# Patient Record
Sex: FEMALE | Race: WHITE | NOT HISPANIC OR LATINO | Employment: OTHER | URBAN - METROPOLITAN AREA
[De-identification: names, ages, dates, MRNs, and addresses within clinical notes are randomized per-mention and may not be internally consistent; named-entity substitution may affect disease eponyms.]

---

## 2022-09-13 ENCOUNTER — HOSPITAL ENCOUNTER (EMERGENCY)
Facility: CLINIC | Age: 27
Discharge: HOME | End: 2022-09-13
Admitting: PHYSICIAN ASSISTANT
Payer: OTHER GOVERNMENT

## 2022-09-13 ENCOUNTER — APPOINTMENT (OUTPATIENT)
Dept: ULTRASOUND IMAGING | Facility: CLINIC | Age: 27
End: 2022-09-13
Payer: OTHER GOVERNMENT

## 2022-09-13 VITALS
DIASTOLIC BLOOD PRESSURE: 81 MMHG | BODY MASS INDEX: 32.14 KG/M2 | RESPIRATION RATE: 18 BRPM | WEIGHT: 200 LBS | OXYGEN SATURATION: 97 % | HEIGHT: 66 IN | TEMPERATURE: 98 F | HEART RATE: 91 BPM | SYSTOLIC BLOOD PRESSURE: 119 MMHG

## 2022-09-13 DIAGNOSIS — O20.8 SUBCHORIONIC HEMORRHAGE IN FIRST TRIMESTER: ICD-10-CM

## 2022-09-13 PROCEDURE — 76801 OB US < 14 WKS SINGLE FETUS: CPT

## 2022-09-13 PROCEDURE — 99284 EMERGENCY DEPT VISIT MOD MDM: CPT | Mod: 25

## 2022-09-13 ASSESSMENT — ENCOUNTER SYMPTOMS: ABDOMINAL PAIN: 1

## 2022-09-13 ASSESSMENT — ACTIVITIES OF DAILY LIVING (ADL): ADLS_ACUITY_SCORE: 33

## 2022-09-13 NOTE — DISCHARGE INSTRUCTIONS
Your ultrasound is reassuring showing a single intrauterine pregnancy dating 9 weeks.  There is a subchorionic hemorrhage but with you reporting no vaginal bleeding and no significant abdominal pain at this point time this can be followed up as an outpatient.

## 2022-09-13 NOTE — ED PROVIDER NOTES
EMERGENCY DEPARTMENT ENCOUNTER      NAME: Amelia Abel  AGE: 27 year old female  YOB: 1995  MRN: [id number]  EVALUATION DATE & TIME: 2022  4:36 PM    PCP: No primary care provider on file.    ED PROVIDER: Saleem Bledsoe PA-C      Chief Complaint   Patient presents with     Groin Pain         FINAL IMPRESSION:  1. Subchorionic hemorrhage in first trimester          MEDICAL DECISION MAKING:    Pertinent Labs & Imaging studies reviewed. (See chart for details)  27 year old female presents to the Emergency Department for evaluation of episode of right lower quadrant pain.  Patient is 9 weeks pregnant.  She denies any vaginal bleeding, currently the pain is resolved but she is concerned with the pregnancy.    At this point time plan to screen with OB ultrasound first trimester.    Ultrasound does confirm single intrauterine pregnancy.  There is a low large subchorionic hemorrhage, but patient denies any vaginal bleeding and she currently denies any abdominal pain.  At this point time she will be discharged to home with recommended outpatient follow-up through her OB/GYN.    ED COURSE    I met with the patient, obtained history, performed an initial exam, and discussed options and plan for diagnostics and treatment here in the ED.    At the conclusion of the encounter I discussed the results of all of the tests and the disposition. The questions were answered. The patient or family acknowledged understanding and was agreeable with the care plan.     MEDICATIONS GIVEN IN THE EMERGENCY:  Medications - No data to display    NEW PRESCRIPTIONS STARTED AT TODAY'S ER VISIT  New Prescriptions    No medications on file            =================================================================    HPI    Patient information was obtained from: Patient  Amelia Abel is a 27 year old female with a pertinent history of G2,  who presents to this ED for evaluation of acute episode of right lower  "quadrant abdominal pain.  The pain started abruptly while she was doing [employer] exercises.  She had a 50 pound ball when she was doing a deep squat and when she stood back up she felt the pain in her right inguinal area.  This was done earlier in the day and since then the pain is completely resolved.  She is 9 weeks pregnant.  She denies any vaginal bleeding.  No reports of urinary symptoms.  She denies any fever or chills.  Because she is pregnant she had a previous miscarriage she wanted to be assessed here to make sure pregnancy was fine.      REVIEW OF SYSTEMS   Review of Systems   Gastrointestinal: Positive for abdominal pain.   Genitourinary: Negative for vaginal bleeding and vaginal discharge.   All other systems reviewed and are negative.         PAST MEDICAL HISTORY:  No past medical history on file.    PAST SURGICAL HISTORY:  No past surgical history on file.      CURRENT MEDICATIONS:    No current facility-administered medications for this encounter.  No current outpatient medications on file.      ALLERGIES:  No Known Allergies    FAMILY HISTORY:  No family history on file.    SOCIAL HISTORY:        VITALS:  Patient Vitals for the past 24 hrs:   BP Temp Temp src Pulse Resp SpO2 Height Weight   09/13/22 1620 119/81 98  F (36.7  C) Temporal 91 18 97 % 1.676 m (5' 6\") 90.7 kg (200 lb)       PHYSICAL EXAM    Physical Exam  Vitals and nursing note reviewed.   Constitutional:       General: She is not in acute distress.     Appearance: She is normal weight.   HENT:      Head: Normocephalic.      Right Ear: External ear normal.      Left Ear: External ear normal.      Nose: Nose normal.   Eyes:      Conjunctiva/sclera: Conjunctivae normal.   Cardiovascular:      Rate and Rhythm: Normal rate.      Pulses: Normal pulses.   Pulmonary:      Effort: Pulmonary effort is normal. No respiratory distress.   Abdominal:      General: Abdomen is flat.      Tenderness: There is no abdominal tenderness. There is " no guarding or rebound.   Musculoskeletal:         General: No tenderness, deformity or signs of injury. Normal range of motion.      Cervical back: Normal range of motion.   Skin:     General: Skin is warm and dry.      Findings: No rash.   Neurological:      General: No focal deficit present.      Mental Status: She is alert. Mental status is at baseline.      Sensory: No sensory deficit.      Motor: No weakness.          LAB:  All pertinent labs reviewed and interpreted.  Results for orders placed or performed during the hospital encounter of 09/13/22   US OB < 14 Weeks Single    Impression    IMPRESSION:   1.  Single living intrauterine gestation at 9 weeks 0 days, EDC 04/18/2023.  2.  Large subchorionic hemorrhage. Attention at follow-up suggested.           RADIOLOGY:  Reviewed all pertinent imaging. Please see official radiology report.  US OB < 14 Weeks Single   Final Result   IMPRESSION:    1.  Single living intrauterine gestation at 9 weeks 0 days, EDC 04/18/2023.   2.  Large subchorionic hemorrhage. Attention at follow-up suggested.                  Saleem Bledsoe PA-C  Emergency Medicine  [facility]     Saleem Bledsoe PA-C  09/13/22 1759

## 2022-09-13 NOTE — ED TRIAGE NOTES
Pt presents to the ED with c/o groin pain after doing squats during a workout. Pt 9 weeks pregnant and worried about baby. Denies any pain or vaginal bleeding.